# Patient Record
Sex: FEMALE | Race: WHITE
[De-identification: names, ages, dates, MRNs, and addresses within clinical notes are randomized per-mention and may not be internally consistent; named-entity substitution may affect disease eponyms.]

---

## 2018-02-20 ENCOUNTER — HOSPITAL ENCOUNTER (EMERGENCY)
Dept: HOSPITAL 80 - FED | Age: 23
LOS: 1 days | Discharge: HOME | End: 2018-02-21
Payer: COMMERCIAL

## 2018-02-20 VITALS — TEMPERATURE: 99.1 F
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
## 2018-02-20 DIAGNOSIS — S61.412A: Primary | ICD-10-CM

## 2018-02-20 PROCEDURE — 0HQGXZZ REPAIR LEFT HAND SKIN, EXTERNAL APPROACH: ICD-10-PCS | Performed by: EMERGENCY MEDICINE

## 2018-02-20 NOTE — EDPHY
H & P


Stated Complaint: lac to left hand





- Personal History


Current Tetanus/Diphtheria Vaccine: Yes


Current Tetanus Diphtheria and Acellular Pertussis (TDAP): Yes





- Medical/Surgical History


Hx Asthma: No


Hx Chronic Respiratory Disease: No


Hx Diabetes: No


Hx Cardiac Disease: No


Hx Renal Disease: No


Hx Cirrhosis: No


Hx Alcoholism: No


Hx HIV/AIDS: No


Hx Splenectomy or Spleen Trauma: No


Other PMH: denies





- Social History


Smoking Status: Never smoked


Time Seen by Provider: 02/20/18 23:17


HPI/ROS: 





Chief Complaint:  Hand laceration





HPI:  22-year-old woman sustained a laceration to her left hand when she tried 

to catch a falling ceramic Tupperware container and it broke in her hand.  This 

happened about 30 min prior to arrival.  Denies any other injuries.  She is up-

to-date in her tetanus.





ROS:  10 point Review of Systems is negative except as noted in the HPI.





PMH:  Denies





Social History: [No] smoking, [no] alcohol, [ no recreational drug use]





Family History: [non-contributory]





Physical Exam:


General:  Awake, alert, no acute distress


Left hand:  She has a 1.5 cm laceration at the base of her 3rd finger.  It is 

linear.  She has full flexion strength of her MCP, PIP and the IP joints.  

Sensation is intact medially and laterally.  She has normal capillary refill.


Skin:  No rash


 (Andre Muller)


Constitutional: 





 Initial Vital Signs











Temperature (C)  37.3 C   02/20/18 23:10


 


Heart Rate  78   02/20/18 23:10


 


Respiratory Rate  18   02/20/18 23:10


 


Blood Pressure  154/85 H  02/20/18 23:10


 


O2 Sat (%)  96   02/20/18 23:10








 











O2 Delivery Mode               Room Air














Allergies/Adverse Reactions: 


 





[email or web address] Allergy (Verified 02/20/18 23:19)


 








Home Medications: 














 Medication  Instructions  Recorded


 


Norethindrone-E.estradiol-Iron 1 each PO 02/20/18





[Tilia Fe 28 Tablet]  














Medical Decision Making


Procedures: 


I was asked by Dr. Muller to repair hand laceration.





Laceration repair.





Verbal consent was obtained from the patient.  The 2.5 cm laceration on the 

left palm between left 3rd and 4th finger was anesthetized using 1% lidocaine 

with epinephrine.  The wound was irrigated with saline, draped and explored to 

its base with a gloved finger.  There were no deep structures involved.  No 

tendon injury was identified.  The wound was repaired with 4 0 Ethilon, 5 

sutures.  The wound repair was simple.  The procedure was performed by myself. (

Kim Xavier)





Departure





- Departure


Disposition: Home, Routine, Self-Care


Clinical Impression: 


 Laceration





Condition: Good


Instructions:  Care For Your Stitches (ED), Laceration (ED)


Additional Instructions: 


Sutures need to be removed in about 10 days, you may return to the emergency 

department or follow up with her primary physician for this.


Return to the emergency depart for increasing redness, discharge from the wound

, streaking up your hand, fevers, chills, or any other concerns.


Referrals: 


KORINA HESS [Primary Care Provider] - As per Instructions

## 2018-02-21 VITALS
HEART RATE: 71 BPM | DIASTOLIC BLOOD PRESSURE: 77 MMHG | SYSTOLIC BLOOD PRESSURE: 139 MMHG | RESPIRATION RATE: 16 BRPM | OXYGEN SATURATION: 94 %